# Patient Record
Sex: FEMALE | Race: OTHER | HISPANIC OR LATINO | URBAN - METROPOLITAN AREA
[De-identification: names, ages, dates, MRNs, and addresses within clinical notes are randomized per-mention and may not be internally consistent; named-entity substitution may affect disease eponyms.]

---

## 2018-05-15 ENCOUNTER — EMERGENCY (EMERGENCY)
Facility: HOSPITAL | Age: 53
LOS: 1 days | Discharge: HOME | End: 2018-05-15
Attending: EMERGENCY MEDICINE | Admitting: EMERGENCY MEDICINE
Payer: COMMERCIAL

## 2018-05-15 VITALS
OXYGEN SATURATION: 98 % | TEMPERATURE: 98 F | DIASTOLIC BLOOD PRESSURE: 65 MMHG | HEART RATE: 90 BPM | SYSTOLIC BLOOD PRESSURE: 138 MMHG | RESPIRATION RATE: 20 BRPM

## 2018-05-15 VITALS
RESPIRATION RATE: 16 BRPM | DIASTOLIC BLOOD PRESSURE: 76 MMHG | SYSTOLIC BLOOD PRESSURE: 136 MMHG | OXYGEN SATURATION: 98 % | TEMPERATURE: 99 F | HEART RATE: 82 BPM

## 2018-05-15 DIAGNOSIS — O20.0 THREATENED ABORTION: ICD-10-CM

## 2018-05-15 DIAGNOSIS — Z90.721 ACQUIRED ABSENCE OF OVARIES, UNILATERAL: Chronic | ICD-10-CM

## 2018-05-15 LAB
ALBUMIN SERPL ELPH-MCNC: 3.7 G/DL — SIGNIFICANT CHANGE UP (ref 3.3–5)
ALP SERPL-CCNC: 96 U/L — SIGNIFICANT CHANGE UP (ref 40–120)
ALT FLD-CCNC: 10 U/L — SIGNIFICANT CHANGE UP (ref 4–33)
APPEARANCE UR: SIGNIFICANT CHANGE UP
APTT BLD: 25.8 SEC — LOW (ref 27.5–37.4)
AST SERPL-CCNC: 26 U/L — SIGNIFICANT CHANGE UP (ref 4–32)
BASOPHILS # BLD AUTO: 0.06 K/UL — SIGNIFICANT CHANGE UP (ref 0–0.2)
BASOPHILS NFR BLD AUTO: 0.3 % — SIGNIFICANT CHANGE UP (ref 0–2)
BILIRUB SERPL-MCNC: 0.3 MG/DL — SIGNIFICANT CHANGE UP (ref 0.2–1.2)
BILIRUB UR-MCNC: NEGATIVE — SIGNIFICANT CHANGE UP
BLD GP AB SCN SERPL QL: NEGATIVE — SIGNIFICANT CHANGE UP
BLOOD UR QL VISUAL: HIGH
BUN SERPL-MCNC: 16 MG/DL — SIGNIFICANT CHANGE UP (ref 7–23)
CALCIUM SERPL-MCNC: 9.3 MG/DL — SIGNIFICANT CHANGE UP (ref 8.4–10.5)
CHLORIDE SERPL-SCNC: 100 MMOL/L — SIGNIFICANT CHANGE UP (ref 98–107)
CO2 SERPL-SCNC: 21 MMOL/L — LOW (ref 22–31)
COLOR SPEC: YELLOW — SIGNIFICANT CHANGE UP
CREAT SERPL-MCNC: 0.54 MG/DL — SIGNIFICANT CHANGE UP (ref 0.5–1.3)
EOSINOPHIL # BLD AUTO: 0.08 K/UL — SIGNIFICANT CHANGE UP (ref 0–0.5)
EOSINOPHIL NFR BLD AUTO: 0.4 % — SIGNIFICANT CHANGE UP (ref 0–6)
GLUCOSE SERPL-MCNC: 111 MG/DL — HIGH (ref 70–99)
GLUCOSE UR-MCNC: NEGATIVE — SIGNIFICANT CHANGE UP
HCG SERPL-ACNC: SIGNIFICANT CHANGE UP MIU/ML
HCT VFR BLD CALC: 40.6 % — SIGNIFICANT CHANGE UP (ref 34.5–45)
HGB BLD-MCNC: 13.7 G/DL — SIGNIFICANT CHANGE UP (ref 11.5–15.5)
IMM GRANULOCYTES # BLD AUTO: 0.3 # — SIGNIFICANT CHANGE UP
IMM GRANULOCYTES NFR BLD AUTO: 1.6 % — HIGH (ref 0–1.5)
INR BLD: 0.94 — SIGNIFICANT CHANGE UP (ref 0.88–1.17)
KETONES UR-MCNC: NEGATIVE — SIGNIFICANT CHANGE UP
LEUKOCYTE ESTERASE UR-ACNC: SIGNIFICANT CHANGE UP
LYMPHOCYTES # BLD AUTO: 10.8 % — LOW (ref 13–44)
LYMPHOCYTES # BLD AUTO: 2.04 K/UL — SIGNIFICANT CHANGE UP (ref 1–3.3)
MCHC RBC-ENTMCNC: 30.5 PG — SIGNIFICANT CHANGE UP (ref 27–34)
MCHC RBC-ENTMCNC: 33.7 % — SIGNIFICANT CHANGE UP (ref 32–36)
MCV RBC AUTO: 90.4 FL — SIGNIFICANT CHANGE UP (ref 80–100)
MONOCYTES # BLD AUTO: 0.53 K/UL — SIGNIFICANT CHANGE UP (ref 0–0.9)
MONOCYTES NFR BLD AUTO: 2.8 % — SIGNIFICANT CHANGE UP (ref 2–14)
MUCOUS THREADS # UR AUTO: SIGNIFICANT CHANGE UP
NEUTROPHILS # BLD AUTO: 15.86 K/UL — HIGH (ref 1.8–7.4)
NEUTROPHILS NFR BLD AUTO: 84.1 % — HIGH (ref 43–77)
NITRITE UR-MCNC: NEGATIVE — SIGNIFICANT CHANGE UP
NRBC # FLD: 0 — SIGNIFICANT CHANGE UP
PH UR: 6 — SIGNIFICANT CHANGE UP (ref 4.6–8)
PLATELET # BLD AUTO: 292 K/UL — SIGNIFICANT CHANGE UP (ref 150–400)
PMV BLD: 10.4 FL — SIGNIFICANT CHANGE UP (ref 7–13)
POTASSIUM SERPL-MCNC: 4.5 MMOL/L — SIGNIFICANT CHANGE UP (ref 3.5–5.3)
POTASSIUM SERPL-SCNC: 4.5 MMOL/L — SIGNIFICANT CHANGE UP (ref 3.5–5.3)
PROT SERPL-MCNC: 6.6 G/DL — SIGNIFICANT CHANGE UP (ref 6–8.3)
PROT UR-MCNC: 10 MG/DL — SIGNIFICANT CHANGE UP
PROTHROM AB SERPL-ACNC: 10.8 SEC — SIGNIFICANT CHANGE UP (ref 9.8–13.1)
RBC # BLD: 4.49 M/UL — SIGNIFICANT CHANGE UP (ref 3.8–5.2)
RBC # FLD: 17.4 % — HIGH (ref 10.3–14.5)
RBC CASTS # UR COMP ASSIST: HIGH (ref 0–?)
RH IG SCN BLD-IMP: POSITIVE — SIGNIFICANT CHANGE UP
SODIUM SERPL-SCNC: 135 MMOL/L — SIGNIFICANT CHANGE UP (ref 135–145)
SP GR SPEC: 1.03 — SIGNIFICANT CHANGE UP (ref 1–1.04)
SQUAMOUS # UR AUTO: SIGNIFICANT CHANGE UP
UROBILINOGEN FLD QL: NORMAL MG/DL — SIGNIFICANT CHANGE UP
WBC # BLD: 18.87 K/UL — HIGH (ref 3.8–10.5)
WBC # FLD AUTO: 18.87 K/UL — HIGH (ref 3.8–10.5)

## 2018-05-15 PROCEDURE — 99284 EMERGENCY DEPT VISIT MOD MDM: CPT

## 2018-05-15 PROCEDURE — 76830 TRANSVAGINAL US NON-OB: CPT | Mod: 26

## 2018-05-15 NOTE — CONSULT NOTE ADULT - PROBLEM SELECTOR RECOMMENDATION 9
- No acute gyn intervention  - Pt advised to call her PERRY for close U/S f/u; pt already has one scheduled on 5/24, but may need one sooner at her PERRY's discretion  - Pt plans on re-establishing care here with Dr. Anne who she saw years ago here. Will call to make an appointment.    Pt seen and examined with Dr. Damian Dillard, PGY-2

## 2018-05-15 NOTE — ED PROVIDER NOTE - OBJECTIVE STATEMENT
25F c/o passing small clot and then spotting today. Pt. s/p IVF, had routine appt. yest. with OB and had U/S showing 6wk 5d IUP, no sx yest. To ED with mild low abd cramping, no further bleeding. Pt. s/p L oophorectomy due to fibroids/cysts/scarring, states had "nl" R ovary on imaging yest.

## 2018-05-15 NOTE — ED PROVIDER NOTE - PROGRESS NOTE DETAILS
Attending Note (Cait): patient signed out pending US results.  Received call from radiology regarding US results.  Patient informed.  GYN consult paged.  Case d/w covering nurse for patient's gyn practice  Current meds: aspirin, plavix, naltrexone, prednisone, nupagen, synthroid, progesterone,   Beta hcg yesterday [id number] Attending Note (Cait): case d/w gyn consultant.  in their opionion, less likely interstitial pregnancy and that there is an error in measurement based upon the angle of the image.  given that patient is non tender and pain free, feel patient is safe for outpatient follow up and serial imaging.

## 2018-05-15 NOTE — CONSULT NOTE ADULT - ASSESSMENT
53 y/o  s/p IVF with egg transfer on 18 with IUP, concerning for possible interstitial pregnancy on U/S today, not in any pain

## 2018-05-15 NOTE — CONSULT NOTE ADULT - SUBJECTIVE AND OBJECTIVE BOX
53 y/o  s/p IVF with egg transfer on 18 presenting to ED for VB. Pt reports she had her first transvaginal ultrasound at her PERRY's office (Dr. Collins in [place]) yesterday and was told the pregnancy was normal. Then this morning she had some spotting and passed a small clot. She only changed one pad today. She denies abd pain, CP, SOB, fevers, chills, N/V  OBHx: 5x c/s, 4x SAB  GynHx: Fibroids s/p ~4 LSC myomectomies, one of which involved a LSO  PMH: Denies  SHx: as above, then also tonsillectomy and abdominoplasty  All: NKDA  Meds: progesterone, estroge, natrexone, lovenox, ASA, PNV all from her PERRY    VS  T(C): 37.2 (05-15-18 @ 21:09)  HR: 82 (05-15-18 @ 21:09)  BP: 136/76 (05-15-18 @ 21:09)  RR: 16 (05-15-18 @ 21:09)  SpO2: 98% (05-15-18 @ 21:09)    Physical Exam:  General: NAD  Abdomen: Soft, non-tender to even deep palpation, non-distended  Pelvic: Labia wnl w/o lesions or erythema, minimal bleeding in vaginal vault, no cervical discharge or erythema, no CMT, uterus enlarged and lobular from fibroids, minimally tender over one fibroid, adnexa w/o masses and tenderness               13.7   18.87 )-----------( 292      ( 05-15 @ 21:40 )             40.6     05-15 @ 21:40    135  |  100  |  16  ----------------------------<  111  4.5   |  21  |  0.54    Ca    9.3      05-15 @ 21:40    TPro  6.6  /  Alb  3.7  /  TBili  0.3  /  DBili  x   /  AST  26  /  ALT  10  /  AlkPhos  96  05-15 @ 21:40    Blood Type: O Positive  Antibody Screen: Negative    < from: US Transvaginal (05.15.18 @ 20:21) >  FINDINGS:    Uterus: Enlarged, leiomyomatous uterus. Intrauterine gestational sac is   eccentric in located in the endometrial cavity, close to the fundus on   the right. Myometrial thickness in this region measures approximately 4   mm, raising the possibility of interstitial pregnancy. Fetal pole is   present. Crown-rump length measures 0.75 cm corresponding to gestational   age of 4 weeks 5 days. Yolk sac was unable to be visualized. Fetal heart   rate was 112 bpm.    Gestational sac    Right ovary: Not visualized.    Left ovary: Not visualized.    Fluid: None.    IMPRESSION:    Live intrauterine gestation of 4 weeks 5 days. Gestational sac is  eccentric in location at the extreme right fundus. Myometrial thickness   measures approximately 4 mm, raising the possibility of interstitial   pregnancy.    < end of copied text >

## 2018-05-15 NOTE — ED PROVIDER NOTE - MEDICAL DECISION MAKING DETAILS
52F s/p IVF with vag. spotting and cramping x 1d.  U/S transvag, check UA, potential heterotopic but low suspicion.

## 2018-05-22 ENCOUNTER — LABORATORY RESULT (OUTPATIENT)
Age: 53
End: 2018-05-22

## 2018-05-22 ENCOUNTER — ASOB RESULT (OUTPATIENT)
Age: 53
End: 2018-05-22

## 2018-05-22 ENCOUNTER — TRANSCRIPTION ENCOUNTER (OUTPATIENT)
Age: 53
End: 2018-05-22

## 2018-05-22 ENCOUNTER — APPOINTMENT (OUTPATIENT)
Dept: OBGYN | Facility: CLINIC | Age: 53
End: 2018-05-22
Payer: COMMERCIAL

## 2018-05-22 VITALS
WEIGHT: 119 LBS | BODY MASS INDEX: 26.77 KG/M2 | HEART RATE: 82 BPM | HEIGHT: 56 IN | DIASTOLIC BLOOD PRESSURE: 83 MMHG | SYSTOLIC BLOOD PRESSURE: 148 MMHG

## 2018-05-22 DIAGNOSIS — O09.521 SUPERVISION OF ELDERLY MULTIGRAVIDA, FIRST TRIMESTER: ICD-10-CM

## 2018-05-22 DIAGNOSIS — O34.219 MATERNAL CARE FOR UNSPECIFIED TYPE SCAR FROM PREVIOUS CESAREAN DELIVERY: ICD-10-CM

## 2018-05-22 DIAGNOSIS — Z32.01 ENCOUNTER FOR PREGNANCY TEST, RESULT POSITIVE: ICD-10-CM

## 2018-05-22 DIAGNOSIS — O34.29 MATERNAL CARE DUE TO UTERINE SCAR FROM OTHER PREVIOUS SURGERY: ICD-10-CM

## 2018-05-22 PROCEDURE — 76801 OB US < 14 WKS SINGLE FETUS: CPT

## 2018-05-22 PROCEDURE — 99204 OFFICE O/P NEW MOD 45 MIN: CPT

## 2018-05-22 PROCEDURE — 76817 TRANSVAGINAL US OBSTETRIC: CPT

## 2018-05-22 RX ORDER — PROGESTERONE 100 MG/1
100 INSERT VAGINAL
Refills: 0 | Status: ACTIVE | COMMUNITY

## 2018-05-22 RX ORDER — LEVOTHYROXINE SODIUM 100 UG/1
100 TABLET ORAL
Refills: 0 | Status: ACTIVE | COMMUNITY

## 2018-05-22 RX ORDER — PREDNISONE 5 MG
5 TABLET, DOSE PACK ORAL
Refills: 0 | Status: ACTIVE | COMMUNITY

## 2018-05-22 RX ORDER — FILGRASTIM 300 UG/ML
300 INJECTION, SOLUTION INTRAVENOUS; SUBCUTANEOUS
Refills: 0 | Status: ACTIVE | COMMUNITY

## 2018-05-22 RX ORDER — PROGESTERONE 50 MG/ML
INJECTION, SOLUTION INTRAMUSCULAR
Refills: 0 | Status: ACTIVE | COMMUNITY

## 2018-06-06 ENCOUNTER — LABORATORY RESULT (OUTPATIENT)
Age: 53
End: 2018-06-06

## 2018-06-08 ENCOUNTER — OTHER (OUTPATIENT)
Age: 53
End: 2018-06-08

## 2018-06-20 ENCOUNTER — APPOINTMENT (OUTPATIENT)
Dept: OBGYN | Facility: CLINIC | Age: 53
End: 2018-06-20

## 2018-06-20 ENCOUNTER — APPOINTMENT (OUTPATIENT)
Dept: ANTEPARTUM | Facility: CLINIC | Age: 53
End: 2018-06-20

## 2018-10-10 NOTE — ED ADULT TRIAGE NOTE - AS O2 DELIVERY
INTERVAL HPI/OVERNIGHT EVENTS:  I was called by staff due to pt in painful urinary retention.  Staff unable to pass byrne catheter.  over 600 cc on bladder scan    MEDICATIONS  (STANDING):  cefTRIAXone   IVPB 1 Gram(s) IV Intermittent every 24 hours  citalopram 40 milliGRAM(s) Oral daily  heparin  Injectable 5000 Unit(s) SubCutaneous every 12 hours  ondansetron Injectable 4 milliGRAM(s) IV Push every 6 hours  phenazopyridine 200 milliGRAM(s) Oral three times a day  simvastatin 40 milliGRAM(s) Oral at bedtime  sodium chloride 0.9%. 1000 milliLiter(s) (75 mL/Hr) IV Continuous <Continuous>    MEDICATIONS  (PRN):  ALPRAZolam 0.5 milliGRAM(s) Oral every 6 hours PRN anxiety  HYDROmorphone  Injectable 1 milliGRAM(s) IV Push every 4 hours PRN Severe Pain (7 - 10)  oxyCODONE    IR 10 milliGRAM(s) Oral every 6 hours PRN Moderate Pain (4 - 6)      Allergies    Cipro (Unknown)  eggs (Unknown)  NSAIDs (Unknown)    Intolerances        Vital Signs Last 24 Hrs  T(C): 36.2 (09 Oct 2018 22:25), Max: 36.2 (09 Oct 2018 14:24)  T(F): 97.1 (09 Oct 2018 22:25), Max: 97.1 (09 Oct 2018 14:24)  HR: 82 (09 Oct 2018 22:25) (65 - 94)  BP: 153/94 (09 Oct 2018 22:25) (118/56 - 153/94)  BP(mean): --  RR: 16 (09 Oct 2018 22:25) (16 - 16)  SpO2: --     ON PE:  General: alert and awake, obvious painful distress  Abdomen:soft, nontender  :Urethral mass noted.   Unable to pass catheter, or feeding tube via urethra.  Mass is palpable, diffuse involving entire length of urethra.  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    Plan:fully discussed with pt and .  Will need cystoscopy, attempt at intraoperative urethral catheter placement under anesthesia, biopsy.  Discussed possible failure and need for suprapubic tube by IR although urethral catheter would be preferred INTERVAL HPI/OVERNIGHT EVENTS:  I was called by staff due to pt in painful urinary retention.  Staff unable to pass byrne catheter.  over 600 cc on bladder scan    MEDICATIONS  (STANDING):  cefTRIAXone   IVPB 1 Gram(s) IV Intermittent every 24 hours  citalopram 40 milliGRAM(s) Oral daily  heparin  Injectable 5000 Unit(s) SubCutaneous every 12 hours  ondansetron Injectable 4 milliGRAM(s) IV Push every 6 hours  phenazopyridine 200 milliGRAM(s) Oral three times a day  simvastatin 40 milliGRAM(s) Oral at bedtime  sodium chloride 0.9%. 1000 milliLiter(s) (75 mL/Hr) IV Continuous <Continuous>    MEDICATIONS  (PRN):  ALPRAZolam 0.5 milliGRAM(s) Oral every 6 hours PRN anxiety  HYDROmorphone  Injectable 1 milliGRAM(s) IV Push every 4 hours PRN Severe Pain (7 - 10)  oxyCODONE    IR 10 milliGRAM(s) Oral every 6 hours PRN Moderate Pain (4 - 6)      Allergies    Cipro (Unknown)  eggs (Unknown)  NSAIDs (Unknown)    Intolerances        Vital Signs Last 24 Hrs  T(C): 36.2 (09 Oct 2018 22:25), Max: 36.2 (09 Oct 2018 14:24)  T(F): 97.1 (09 Oct 2018 22:25), Max: 97.1 (09 Oct 2018 14:24)  HR: 82 (09 Oct 2018 22:25) (65 - 94)  BP: 153/94 (09 Oct 2018 22:25) (118/56 - 153/94)  BP(mean): --  RR: 16 (09 Oct 2018 22:25) (16 - 16)  SpO2: --     ON PE:  General: alert and awake, obvious painful distress  Abdomen:soft, nontender  :Urethral mass noted.   Unable to pass catheter, or feeding tube via urethra.  Mass is palpable, diffuse involving entire length of urethra.  induration palpable anterior vaginal sub mucosal    LABS:                        12.3   7.45  )-----------( 273      ( 09 Oct 2018 19:14 )             38.1     10-09    140  |  103  |  9<L>  ----------------------------<  91  4.5   |  21  |  0.5<L>    Ca    9.0      09 Oct 2018 09:25  Mg     2.2     10-09    TPro  6.9  /  Alb  4.3  /  TBili  <0.2  /  DBili  x   /  AST  84<H>  /  ALT  140<H>  /  AlkPhos  96  10-09    Urine culture no growth      RADIOLOGY & ADDITIONAL TESTS:      Impression:  urinary retention secondary to obstruction from urethral mass    Plan:fully discussed with pt and .  Will need cystoscopy, attempt at intraoperative urethral catheter placement under anesthesia, biopsy.  Discussed possible failure and need for suprapubic tube by IR although urethral catheter would be preferred room air

## 2019-01-08 ENCOUNTER — APPOINTMENT (OUTPATIENT)
Dept: OBGYN | Facility: CLINIC | Age: 54
End: 2019-01-08

## 2019-02-03 PROBLEM — O09.521 ELDERLY MULTIGRAVIDA IN FIRST TRIMESTER: Status: ACTIVE | Noted: 2018-05-22

## 2019-11-08 ENCOUNTER — TRANSCRIPTION ENCOUNTER (OUTPATIENT)
Age: 54
End: 2019-11-08